# Patient Record
Sex: MALE | Race: WHITE
[De-identification: names, ages, dates, MRNs, and addresses within clinical notes are randomized per-mention and may not be internally consistent; named-entity substitution may affect disease eponyms.]

---

## 2018-05-15 NOTE — RAD
CHEST TWO VIEWS:

 

History: Cough, wheezing x 1 month. 

 

Comparison: 5-17-15

 

FINDINGS: 

Normal cardiac silhouette. The pulmonary vessels and hilum are normal. No consolidation or mass. No p
neumothorax or osseous abnormalities. 

 

IMPRESSION: 

No acute cardiopulmonary process. 

 

POS: [facility]

## 2018-10-14 NOTE — CT
CT HEAD NONCONTRAST:

 

Indication: Pain, edema, infection. 

 

FINDINGS: 

There is no acute intracranial hemorrhage, mass effect, midline shift or ventriculomegaly. Reference 
to the separately dictated facial for CT for details regarding abnormal soft tissues of the face. 

 

IMPRESSION: 

No acute intracranial abnormalities. 

 

POS: [facility]

## 2018-10-14 NOTE — CT
CT OF FACE WITH CONTRAST:

 

Date:  10/14/18 

 

INDICATION:

Pain, edema. 

 

FINDINGS:

There is prominent soft tissue thickening and increased density of the perioral and nasolabial soft t
issues, more notable on the right. There is a focal area of reduced density at the right nasolabial f
old without well-defined borders. This may relate to an area of inflammatory edema/phlegmon. A well-f
ormed drainable abscess is not visualized in this region. There are areas of periapical lucency of th
e right central/paracentral maxillary incisors, which may provide the source for overlying subcutaneo
us inflammation. Immediately overlying the lucency of the right paracentral incisor, there is a thin 
region of decreased density that measures 11.0 mm transverse x 2.0 mm AP, indicating extension of per
iapical abscess beyond the confines of the alveolar ridge of the maxilla. There is no fluid level of 
the visualized paranasal sinuses. The orbital contents are unremarkable. 

 

IMPRESSION: 

Prominent inflammatory skin thickening and subcutaneous inflammation of the face, at and to the right
 of midline, primarily centered at the right nasolabial fold and perioral soft tissues with underlyin
g phlegmon. There is deep soft tissue edema overlying periapical abscess formation of the right centr
al and paracentral maxillary incisors, which likely relates to extension of periodontal abscess into 
the deep subcutaneous tissues. Recommend dental consultation for further care. 

 

 

POS: [facility]

## 2019-01-17 ENCOUNTER — HOSPITAL ENCOUNTER (OUTPATIENT)
Dept: HOSPITAL 92 - SCSRAD | Age: 24
Discharge: HOME | End: 2019-01-17
Attending: FAMILY MEDICINE
Payer: MEDICAID

## 2019-01-17 DIAGNOSIS — M54.2: Primary | ICD-10-CM

## 2019-01-17 DIAGNOSIS — Z87.81: ICD-10-CM

## 2019-01-17 DIAGNOSIS — M54.6: ICD-10-CM

## 2019-01-17 DIAGNOSIS — R05: ICD-10-CM

## 2019-01-17 PROCEDURE — 72072 X-RAY EXAM THORAC SPINE 3VWS: CPT

## 2019-01-17 PROCEDURE — 71046 X-RAY EXAM CHEST 2 VIEWS: CPT

## 2019-01-17 PROCEDURE — 72040 X-RAY EXAM NECK SPINE 2-3 VW: CPT

## 2019-01-17 NOTE — RAD
THORACIC SPINE FOUR VIEWS:

 

AP, coned-down, lateral, and swimmer's views obtained. 

 

FINDINGS: 

Images demonstrate areas of compression fractures involving the T11 and T12 vertebral levels. These a
ppear to be old. 

 

IMPRESSION: 

T11 and T12 old compression fractures. The rest of the thoracic spine is grossly unremarkable. 

 

POS: [facility]

## 2019-01-17 NOTE — RAD
EXAM:

CHEST TWO VIEWS:

 

History: 

Cough. 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 

 

Comparison: 

5-15-18

 

FINDINGS: 

Heart size is normal. The lungs are clear. No confluent pneumonia, overt edema or pleural effusion. 

 

IMPRESSION: 

No acute intrathoracic disease. 

 

[facility]: [facility]

## 2019-01-17 NOTE — RAD
FOUR VIEWS CERVICAL SPINE INCLUDING AP, LATERAL, OPEN MOUTH ODONTOID AND SUBMENTAL VERTEX VIEWS CERVI
DAMEON SPINE. 

1/17/19

 

HISTORY: 

Patient with history of neck pain.

 

Four views cervical spine demonstrates cervical spine to be unremarkable. No evidence of fracture, osman
bluxations or bony lesions seen. 

 

IMPRESSION:  

Normal four views cervical spine. 

 

POS: [facility]